# Patient Record
Sex: FEMALE | Race: WHITE | NOT HISPANIC OR LATINO | Employment: UNEMPLOYED | URBAN - METROPOLITAN AREA
[De-identification: names, ages, dates, MRNs, and addresses within clinical notes are randomized per-mention and may not be internally consistent; named-entity substitution may affect disease eponyms.]

---
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
## 2018-01-09 NOTE — MISCELLANEOUS
Reason For Visit  Reason For Visit Free Text Note Form: SW met with pt FOB and Mother re: Insurance concerns- Pts Mother had submitted MA application directly to MA with pt as dependent- Pt turned 23 on - High school senior- Pt and FOB residing with Mother-Supportive relationship- has been awaiting MA status- SW mediated contact with Antoinette Mckenna confirmed to Mother denied MA due to over income- Mother employed as reserve nurse and working extra shifts- SW contacted [place]- PT is listed as to be seen at delivery to complete MA process- Pt and Mother informed per MA to request consideration of prior medical bills when new MA katie taken at delivery- Mother has applied for [facility] and has begun pmt plan in interim- Pt coping within normal range for high possibility of Down Syndrome baby- SW provided support and resource info- In pt SW to be notified of pending birth-     Case Management Documentation [facility]:   Information obtained from the patient, patient's significant other and Parent(s)  Patient's financial status unemployed  Action Plan: follow-up needs, supportive counseling/advocacy, information provided and referral(s) made  plan reviewed  Progress Note  Pt and Mother will contact SW as needed-  Active Problems    1  Down syndrome of fetus in current pregnancy, antepartum (655 13) (O35 1XX0)   2  Encounter for pregnancy related examination in third trimester (V22 1) (Z34 83)   3  Maternal serum screen positive for trisomy 21 (796 5) (O28 8)   4  Positive depression screening (796 4) (R68 89)   5  Positive urine drug screen (796 0) (R82 5)    Current Meds   1  Prenatal TABS; Therapy: (Recorded:[id number]) to Recorded    Allergies    1  Penicillins    2   No Known Environmental Allergies    Future Appointments    Date/Time Provider Specialty Site   2016 10:00 AM  [place], Schedule  [facility]   2016 10:30 AM  [facility], [address] [place] [address]   2016 01:45 PM [facility] Rebecca Steven, CRVON Schedule  [address] [facility]     Signatures   Electronically signed by : MARICRUZ Cunningham; 2016  1:33PM EST                       (Author)

## 2018-01-10 NOTE — PROGRESS NOTES
AUG 19 2016         RE: Carmen Winter                                   To: [address]   MR#: [id number]                                  [address] [facility]   : [address] [facility], [address] [place] [address]   ENC: [id number]                             Fax: [phone]   (Exam #: [id number])      The LMP of this 14year old,  G1, P0-0-0-0 patient was MAR 24 2016, giving   her an FELIPE of DEC 29 2016 and a current gestational age of 22 weeks 1 day   by dates  A sonographic examination was performed on AUG 19 2016 using   real time equipment  The ultrasound examination was performed using   abdominal & vaginal techniques  The patient has a BMI of 26 3  Her blood   pressure today was 117/72  Earliest ultrasound found in her record: [address] [place] [address] 16  FELIPE         I had the pleasure of seeing Carmen Winter in the  center on  for a level II ultrasound  She is a 17-year-old  1 para 0 with a   working due date of  currently at 21 weeks and 1 day  She has   no significant medical or surgical history  She denies tobacco or alcohol   use  She does have an allergy to penicillin  She has a BMI of 26  She   denies any significant family history of genetic disorders birth defects   or mental retardation  She denies a family history of venous   thromboembolic disorders or diabetes mellitus  She denies any pregnancy   complications to this point  The patient appeared well-nourished,   well-developed, and in no apparent distress  Her uterus is nontender  Her   abdomen was gravid and nontender  Note that she did have the noninvasive prenatal test performed and it   showed that there was a high likelihood of Down syndrome in her fetus     There was no signs of trisomy 25 or trisomy 13      Cardiac motion was observed at 153 bpm       INDICATIONS      fetal anatomical survey   confirm gestational age      Exam Types      LEVEL II   Transvaginal RESULTS      Fetus # 1 of 1   Breech presentation   Fetal growth appeared normal   Placenta Location = Posterior   No placenta previa   Placenta Grade = I      MEASUREMENTS (* Included In Average GA)      AC              16 9 cm        21 weeks 5 days* (62%)   BPD              4 7 cm        20 weeks 1 day * (25%)   HC              17 6 cm        20 weeks 0 days* (20%)   Femur            3 0 cm        19 weeks 2 days* (10%)      Nuchal Fold      6 0 mm      Humerus          3 0 cm        19 weeks 5 days  (20%)   Radius           2 6 cm        20 weeks 5 days   Ulna             2 8 cm        20 weeks 1 day   Tibia            2 6 cm        19 weeks 3 days  (<5%)   Fibula           2 6 cm        18 weeks 5 days   Foot             3 7 cm        21 weeks 4 days      Cerebellum       2 1 cm        20 weeks 5 days   Biorbit          3 3 cm        20 weeks 6 days   CisternaMagna    4 7 mm      HC/AC           1 04   FL/AC           0 18   FL/BPD          0 64   EFW (Ac/Fl/Hc)   364 grams - 0 lbs 13 oz      THE AVERAGE GESTATIONAL AGE is 20 weeks 2 days +/- 10 days  AMNIOTIC FLUID         Largest Vertical Pocket = 3 9 cm   Amniotic Fluid: Normal      UTERINE ARTERIES                                  S/D   PI    RI    NOTCH       Left Uterine Artery              0 79       Right Uterine Artery             0 76      CERVICAL EVALUATION      The cervix appeared normal (Ultrasound Examination)  SUPINE      Cervical Length: 3 58 cm      OTHER TEST RESULTS           Funneling?: No             Dynamic Changes?: No        Resp  To TFP?: No      ANATOMY      Head                                    Normal   Face/Neck                               Abnormal   Th  Cav  Normal   Heart                                   Normal   Abd  Cav                                 Normal   Stomach                                 Normal   Right Kidney                            Normal   Left Kidney Normal   Bladder                                 Normal   Abd  Wall                               Normal   Spine                                   Normal   Extrems                                 Normal   Genitalia                               Normal   Placenta                                Normal   Umbl  Cord                              Normal   Uterus                                  Normal   PCI                                     Normal      ANATOMY DETAILS      Visualized Appearing Sonographically Normal:   HEAD: (Calvarium, BPD Level, Cavum, Lateral Ventricles, Choroid Plexus,   Cerebellum, Cisterna Magna);    FACE/NECK: (Neck, Profile, Orbits,   Nose/Lips, Palate, Face);    TH  CAV : (Diaphragm); HEART: (Four   Chamber View, Proximal Left Outflow, Proximal Right Outflow, 3 Vessel   Trachea, Short Axis of Greater Vessels, Ductal Arch, Aortic Arch,   Interventricular Septum, Interatrial Septum, Cardiac Axis, Cardiac   Position);    ABD  CAV , STOMACH, RIGHT KIDNEY, LEFT KIDNEY, BLADDER, ABD  WALL, SPINE: (Cervical Spine, Thoracic Spine, Lumbar Spine, Sacrum);      EXTREMS: (Lt Humerus, Rt Humerus, Lt Forearm, Rt Forearm, Lt Hand, Rt   Hand, Lt Femur, Rt Femur, Lt Low Leg, Rt Low Leg, Lt Foot, Rt Foot);      GENITALIA (Male), PLACENTA, UMBL  CORD, UTERUS, PCI      Abnormal:   FACE/NECK: (Nuchal Fold)      ADNEXA      The left ovary appeared normal and measured 3 0 x 1 4 x 1 3 cm with a   volume of 2 9 cc  The right ovary appeared normal and measured 3 3 x 2 0 x   2 0 cm with a volume of 6 9 cc  IMPRESSION      Miller IUP   20 weeks and 2 days by this ultrasound  (FELIPE=JAN 4 2017)   Breech presentation   Fetal growth appeared normal   Regular fetal heart rate of 153 bpm   Prominent nuchal skin fold   absent nasal bone   Posterior placenta   No placenta previa      GENERAL COMMENT      On today's ultrasound, a viable fetus was seen in the breech presentation     The placenta is posterior in location and there is no evidence of a   placenta previa  Amniotic fluid appeared normal  Fetal growth appeared   very appropriate and correlated well with her due date  Note that there   were major anomalies seen on today's exam  The nasal bone was not present  This is concerning  The nuchal skin fold also had an enlarged value   greater than 6 mm and this is also concerning  The femur length was also   somewhat small  Thus there were multiple markers today which made make it   highly likely that the fetus has Down syndrome  Both maternal ovaries were   seen and appeared normal  There were no obvious subchorionic hematomas or   uterine myomas  The uterine artery Doppler flow studies were normal  Her   cervix was seen transvaginally and appeared normal in length with no   evidence of funneling or dynamic change  The placental cord insertion were   seen and was normal in location  Down syndrome (DS) also known as trisomy 24, is a genetic disorder caused   by the presence of all, or part of a third copy of chromosome 21  It is   typically associated with physical growth delays, characteristic facial   features, and mild to moderate intellectual disability  The average IQ of a   young adult with Down syndrome is 48, equivalent to the mental age of an   6 or 5year-old child, but this can vary widely  The parents of the   affected individual are typically genetically normal The extra chromosome   occurs by random chance  There is no known behavior or environmental   factor that changes the risk  Down syndrome can be identified during   pregnancy by prenatal screening followed by diagnostic testing, or after   birth by direct observation and genetic testing  Since the introduction of   screening, pregnancies with the diagnosis are often terminated  Regular   screening for health problems common in Down syndrome is recommended   throughout the person's life   Those with Down syndrome nearly always have physical and intellectual disabilities  As adults, their mental abilities   are typically similar to those of an 6 or 5year-old  They have an   increased risk of a number of other health problems, including congenital   heart defect, epilepsy, leukemia, thyroid diseases, and mental disorders,   among others  Thus there is a high likelihood of Down syndrome in this child  The   biggest issues on today's ultrasound where the enlarged nuchal skin fold   and the absence of the nasal bone  She is scheduled to come in for a fetal   echocardiogram in 3 weeks  Follow-up growth scans are recommended every   month until delivery  I explained to Nat Calzada that she should also speak with   the neonatologist prior to delivery  After delivery, the neonatologist   will most likely obtain blood work from this child  Please note that Nat Calzada   and her boyfriend should  have their chromosomes tested as well  Thank you   kindly for this referral    Total face-to-face time with the patient, excluding ultrasound time was 15   minutes with more than 50% of the time devoted to counseling and   coordination of care  NICO Gill M D     Maternal-Fetal Medicine   Electronically signed 08/19/16 13:06           Electronically signed by:Alvin Maki MD  Aug 22 2016  7:46AM EST Acknowledgement

## 2018-01-11 NOTE — MISCELLANEOUS
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, aspirations for nursing career, resides with her Mother- Mother RN, supportive- pt significantly yngst of several sibs- good family support- Ambivilant re: pregnancy , will continue pregnancy - discussed adoption option if pt decides unable to parent at this age-FOB involved- unemployed, supportive , 21 - SW discussed marijuana use- Pt states has d/c'd with pregnancy confirmation- Informed of supportive resources as needed if chronic issue- Pt denies any other psychosocial stressors but has transferred to [employer] and is concerned about reception to pregnancy- High depression score related to pregnancy reality - Encouraged contact with SW as needed- SW met briefly with pts Mother to assure support-     Case Management Documentation [facility]:   Information obtained from the patient and Parent(s)  Action Plan: follow-up needs, supportive counseling/advocacy and information provided  plan reviewed  Progress Note  Pt will contact SW as needed prenatally-  Active Problems    1  Encounter for pregnancy related examination in second trimester (V22 1) (Z34 82)   2  Positive depression screening (796 4) (R68 89)   3  Positive urine drug screen (796 0) (R82 5)   4  Supervision of normal pregnancy in third trimester (V22 1) (Z34 83)    Current Meds   1  Prenatal TABS; Therapy: (Recorded:[id number]) to Recorded    Allergies    1   Penicillins    Future Appointments    Date/Time Provider Specialty Site   2016 09:00 AM [facility]   10/07/2016 10:00 AM [facility]   2016 10:00 AM [facility], [facility]   2016 10:00 AM [facility], [facility] Signatures   Electronically signed by : Iva Patricia MSWLCSW; Aug 25 2016  2:00PM EST                       (Author)

## 2018-01-12 VITALS
BODY MASS INDEX: 30.12 KG/M2 | HEART RATE: 104 BPM | DIASTOLIC BLOOD PRESSURE: 75 MMHG | SYSTOLIC BLOOD PRESSURE: 119 MMHG | WEIGHT: 181 LBS

## 2018-01-12 NOTE — PROGRESS NOTES
2016         RE: Kaylee Kirk                                   To: [address]   MR#: [id number]                                  [address]    : [address] [place] [address], [address] [place] [address]   ENC: [id number]                             Fax: [phone]   (Exam #: [id number])      The LMP of this 14year old,  G1, P0-0-0-0 patient was MAR 24 2016, giving   her an FELIPE of DEC 29 2016 and a current gestational age of 29 weeks 1 day   by dates  A sonographic examination was performed on 2016 using   real time equipment  The ultrasound examination was performed using   abdominal technique  The patient has a BMI of 32 1  Her blood pressure   today was 107/73  Earliest ultrasound [facility] in her record: [facility] 16  FELIPE      Cardiac motion was observed at 156 bpm       INDICATIONS      Down syndrome      Exam Types      Amniotic Fluid Index   NST      RESULTS      Fetus # 1 of 1   Vertex presentation   Placenta Location = Posterior   No placenta previa   Placenta Grade = II      The NST was reactive with no decelerations  AMNIOTIC FLUID      Q1: 4 5      Q2: 6 4      Q3: 3 9      Q4: 3 0   SELINA Total = 17 8 cm   Amniotic Fluid: Normal      IMPRESSION      Miller IUP   Vertex presentation   Fetal heart rate of 156 bpm   Posterior placenta   No placenta previa      RECOMMENDATION      SELINA: 1 Week   NST: 2X per week      NICO Quinones M D     Maternal-Fetal Medicine   Electronically signed 16 14:35           Electronically signed by:Alvin Segovia MD  2016  4:21PM EST Acknowledgement

## 2018-01-12 NOTE — PROGRESS NOTES
DEC 2 2016         RE: Patricia Ramirez                                   To: [address]   MR#: [id number]                                  [address]    : [address] [place] [address], [address] [place] [address]   ENC: [id number]                             Fax: [phone]   (Exam #: [id number])      The LMP of this 23year old,  G1, P0-0-0-0 patient was MAR 24 2016, giving   her an FELIPE of DEC 29 2016 and a current gestational age of 42 weeks 1 day   by dates  A sonographic examination was performed on DEC 2 2016 using real   time equipment  The ultrasound examination was performed using abdominal   technique  The patient has a BMI of 32 3  Her blood pressure today was   114/74  Earliest ultrasound found in her record: [facility] 16  FELIPE      Cardiac motion was observed at 147 bpm       INDICATIONS      Down syndrome   obesity   Interval growth assesment      Exam Types      Level I      RESULTS      Fetus # 1 of 1   Vertex presentation   Fetal growth appeared normal   Placenta Location = Anterior   No placenta previa   Placenta Grade = II      The NST was reactive with no decelerations  MEASUREMENTS (* Included In Average GA)      AC              33 0 cm        37 weeks 1 day * (70%)   BPD              8 6 cm        34 weeks 5 days* (27%)   HC              32 0 cm        35 weeks 4 days* (32%)   Femur            6 4 cm        32 weeks 6 days* (5%)      Cerebellum       4 8 cm        36 weeks 1 day      HC/AC           0 97   FL/AC           0 19   FL/BPD          0 74   EFW (Ac/Fl/Hc)  2732 grams - 6 lbs 0 oz                 (40%)      THE AVERAGE GESTATIONAL AGE is 35 weeks 1 day +/- 21 days        AMNIOTIC FLUID      Q1: 4 5      Q2: 3 2      Q3: 2 9      Q4: 2 5   SELINA Total = 13 1 cm   Amniotic Fluid: Normal      ANATOMY DETAILS      Visualized Appearing Sonographically Normal:   HEAD: (Calvarium, BPD Level, Cavum, Lateral Ventricles, Choroid Plexus, Cerebellum, Cisterna Magna);    TH  CAV  : (Lungs, Diaphragm);    STOMACH,   RIGHT KIDNEY, LEFT KIDNEY, BLADDER, PLACENTA      IMPRESSION      Miller IUP   35 weeks and 1 day by this ultrasound  (FELIPE=JAN 5 2017)   Vertex presentation   Fetal growth appeared normal   Regular fetal heart rate of 147 bpm   Anterior placenta   No placenta previa      GENERAL COMMENT      Her follow up care should include twice weekly NST's and a once weekly   amniotic fluid assessment, along with her daily kick counts  NICO Daily M D     Maternal-Fetal Medicine   Electronically signed 12/02/16 20:48           Electronically signed by:Alvin Fu MD  Dec  3 2016 11:41AM EST Acknowledgement

## 2018-01-13 VITALS
SYSTOLIC BLOOD PRESSURE: 106 MMHG | HEIGHT: 65 IN | BODY MASS INDEX: 29.32 KG/M2 | TEMPERATURE: 100.1 F | DIASTOLIC BLOOD PRESSURE: 70 MMHG | WEIGHT: 176 LBS

## 2018-01-13 NOTE — PROGRESS NOTES
DEC 9 2016         RE: Kaylee Kirk                                   To: [address]   MR#: [id number]                                  [address]    : [address] [place] [address], [address] [place] [address]   ENC: [id number]                             Fax: [phone]   (Exam #: [id number])      The LMP of this 23year old,  G1, P0-0-0-0 patient was MAR 24 2016, giving   her an FELIPE of DEC 29 2016 and a current gestational age of 42 weeks 1 day   by dates  A sonographic examination was performed on DEC 9 2016 using real   time equipment  The ultrasound examination was performed using abdominal   technique  The patient has a BMI of 32 3  Her blood pressure today was   110/75  Earliest ultrasound found in her record: [facility] 16  FELIPE      Cardiac motion was observed at 148 bpm       INDICATIONS      Down syndrome      Exam Types      Amniotic Fluid Index   NST      RESULTS      Fetus # 1 of 1   Vertex presentation   Placenta Location = Fundal   No placenta previa   Placenta Grade = II      The NST was reactive with no decelerations  AMNIOTIC FLUID      Q1: 4 8      Q2: 4 9      Q3: 1 6      Q4: 2 8   SELINA Total = 14 2 cm   Amniotic Fluid: Normal      IMPRESSION      Miller IUP   Vertex presentation   Regular fetal heart rate of 148 bpm   Fundal placenta   No placenta previa      RECOMMENDATION      SELINA: 1 Week   NST: 2X per week      Megan Hem, R D M S Percell Closs, M D     Maternal-Fetal Medicine   Electronically signed 16 11:58           Electronically signed by:Alvin Segovia MD  Dec 15 2016 12:45PM EST Acknowledgement

## 2018-01-13 NOTE — PROGRESS NOTES
DEC 16 2016         RE: Sugey Rosado                                   To: [address]   MR#: [id number]                                  [address] [facility]   : [address] [facility], [address] [place] [address]   ENC: [id number]                             Fax: [phone]   (Exam #: [id number])      The LMP of this 23year old,  G1, P0-0-0-0 patient was MAR 24 2016, giving   her an FELIPE of DEC 29 2016 and a current gestational age of 37 weeks 1 day   by dates  A sonographic examination was performed on DEC 16 2016 using   real time equipment  The ultrasound examination was performed using   abdominal technique  Earliest ultrasound found in her record: [address]  [facility] 16  FELIPE      Cardiac motion was observed at 153 bpm       INDICATIONS      Down syndrome   amniotic fluid check      Exam Types      Amniotic Fluid Index      RESULTS      Fetus # 1 of 1   Vertex presentation   Placenta Location = Posterior   Placenta Grade = II      The NST was reactive with no decelerations  AMNIOTIC FLUID      Q1: 3 9      Q2: 4 7      Q3: 3 3      Q4: 0 8   SELINA Total = 12 7 cm   Amniotic Fluid: Normal      IMPRESSION      Miller IUP   Vertex presentation   Regular fetal heart rate of 153 bpm   Posterior placenta      GENERAL COMMENT      Her follow up care should include twice weekly NST's and a once weekly   amniotic fluid assessment, along with her daily kick counts  NICO Hill S , R TESHA C S  ORTIZ Singletary     Maternal-Fetal Medicine   Electronically signed 16 19:00           Electronically signed by:Alvin Quinonez MD  Dec 19 2016  4:57PM EST Acknowledgement

## 2018-01-13 NOTE — PROGRESS NOTES
JAZMIN 3 2017         RE: Patricia Ramirez                                   To: [address]   MR#: [id number]                                  [address]   : [address], [address] [facility]   ENC: [id number]                             Fax: [phone]   (Exam #: [id number])      The LMP of this 23year old,  G1, P0-0-0-0 patient was MAR 24 2016, giving   her an FELIPE of DEC 29 2016 and a current gestational age of 43 weeks 5 days   by dates  A sonographic examination was performed on JAZMIN 3 2017 using real   time equipment  The ultrasound examination was performed using abdominal   technique  The patient has a BMI of 33 4  Her blood pressure today was   128/66  Earliest ultrasound found in her record: [facility] 16  FELIPE      Cardiac motion was observed at 157 bpm       INDICATIONS      Down syndrome   post FELIPE      Exam Types      Amniotic Fluid Index   NST      RESULTS      Fetus # 1 of 1   Vertex presentation   Placenta Location = Posterior   No placenta previa   Placenta Grade = III      The NST was reactive with no decelerations  AMNIOTIC FLUID      Q1: 2 3      Q2: 4 4      Q3: 1 2      Q4:   SELINA Total = 7 9 cm   Amniotic Fluid: Normal      IMPRESSION      Miller IUP   Vertex presentation   Regular fetal heart rate of 157 bpm   Posterior placenta   No placenta previa      GENERAL COMMENT      Rajinder Harrison was sent to Labor and Delivery for further evaluation and management   with plans for labor induction given her current gestational age of 40-5/7   weeks, cervical dilation of 3 cm on recent exam, and suspicion of Down   syndrome which is associated with an increased risk for stillbirth  NICO Ferreira M D     Maternal-Fetal Medicine   Electronically signed 17 16:11

## 2018-01-13 NOTE — PROGRESS NOTES
SEP 9 2016         RE: Shahrzad Chavez                                   To: [address]   MR#: [id number]                                  [address]    : [address] [place] [address], [id number] [place] [address]   ENC: [id number]                             Fax: [phone]   (Exam #: [id number])      The LMP of this 14year old,  G1, P0-0-0-0 patient was MAR 24 2016, giving   her an FELIPE of DEC 29 2016 and a current gestational age of 23 weeks 1 day   by dates  A sonographic examination was performed on SEP 9 2016 using real   time equipment  The ultrasound examination was performed using abdominal   technique  Earliest ultrasound found in her record: [facility] US 16  FELIPE      Kenny Thomas has no complaints  She reports fetal movement and denies vaginal   bleeding  She presents for fetal echocardiography today for the indication   of recent non invasive prenatal testing which was positive for Down   syndrome        Cardiac motion was observed at 142 bpm       INDICATIONS      abnormal [place]      Exam Types      Fetal Echocardiogram      RESULTS      Fetus # 1 of 1   Vertex presentation   Placenta Location = Posterior   No placenta previa   Placenta Grade = I      AMNIOTIC FLUID      Q1: 5 5      Q2: 4 8      Q3: 4 2      Q4: 4 1   SELINA Total = 18 6 cm   Amniotic Fluid: Normal      FETAL VESSELS                                     S/D   PI    RI    PSV   AEDV RF                                                    cm/s       Umbilical Artery                 1 51       Middle Cerebral Artery           1 54      FETAL VESSELS                                    PVSys PVDia PASys  S/D   S/A   DVI   RF       Ductus Venosus:                                                No      IMPRESSION      Miller IUP   Vertex presentation   Regular fetal heart rate of 142 bpm   Posterior placenta   No placenta previa      GENERAL COMMENT      Fetal echocardiography was performed for the indication of  suspicion of   Down syndrome on non invasive prenatal testing  The heart is in normal   anatomic position within the left chest   All four chambers were   identified with a normal, 45-degree leftward axis  There is no suspicion   of an echogenic intracardiac focus  Tricuspid regurgitation is not   present  Right and left ventricular and atrial sizes were concordant  The ventricular and atrial septi appear intact by 2D echo and color   Doppler  The aorta arises in normal anatomic relationship from the left   ventricle  The pulmonary artery arises in normal anatomic relationship   from the right ventricle  The short axis and three vessel views appear   normal   The ductus arterosis joins the aorta in a normal anatomic   relationship  The aortic arch, pulmonary veins, inferior and superior   vena cava, and the right and left ventricular outflow tracts were   identified and appear normal  The flow velocities across the mitral,   tricuspid, aortic, pulmonary valves, aortic arch and ductus arteriosus,   appear normal  There is no evidence of an anatomical defect present within   the heart  The fetal heart rate was regular throughout the exam period  There is no suspicion of a fetal dysrhythmia  The umbilical and middle   cerebral artery, and ductus venosus, Doppler studies are normal       Today's ultrasound findings were discussed in detail with Anel  She was   advised of the findings and counseled about the limitations of fetal   echocardiography in detecting all forms of congenital anomalies  For   example, fetal echocardiography may not detect small septal defects and   minor valvular abnormalities  Other examples of difficult    diagnosis include post valvular stenosis, coarctation of the aorta, and   anomalous pulmonary venous return  I recommended that Jake Kebede consider   amniocentesis for definitive prenatal diagnosis of Down syndrome   The   positive predictive value of NIPT for a 14year-old patient is less than   50%, though the absent nasal bone and mildly increased nuchal skin fold   thickness on the level II ultrasound study further suggests the   possibility of Down syndrome  Pawel Watters declined genetic amniocentesis at the   visit today  She will [facility]   on October 7 for   follow-up [facility] ultrasound evaluation  Serial fetal interval ultrasound   studies are recommended during the third trimester  Non stress testing is   recommended for additional pregnancy surveillance beginning at about 30   weeks gestation, as Down syndrome is associated with increased risk for   stillbirth  The face to face time, in addition to time spent discussing ultrasound   results, was 10 minutes, greater than 50% of which was spent during   counseling and coordination of care  NICO Jones M D     Maternal-Fetal Medicine   Electronically signed 09/11/16 11:08           Electronically signed by:Alvin Ferris MD  Sep 13 2016  9:37AM EST Acknowledgement

## 2018-01-14 VITALS
BODY MASS INDEX: 29.16 KG/M2 | HEART RATE: 99 BPM | WEIGHT: 175.25 LBS | SYSTOLIC BLOOD PRESSURE: 135 MMHG | DIASTOLIC BLOOD PRESSURE: 89 MMHG

## 2018-01-14 VITALS
SYSTOLIC BLOOD PRESSURE: 128 MMHG | BODY MASS INDEX: 33.52 KG/M2 | HEIGHT: 65 IN | WEIGHT: 201.2 LBS | DIASTOLIC BLOOD PRESSURE: 66 MMHG

## 2018-01-14 NOTE — PROGRESS NOTES
2016         RE: Peggy Atwood                                   To: [address]   MR#: [id number]                                  [address]    : [address] [place] [address], [address] [place] [address]   ENC: [id number]                             Fax: [phone]   (Exam #: [id number])      The LMP of this 23year old,  G1, P0-0-0-0 patient was MAR 24 2016, giving   her an FELIPE of DEC 29 2016 and a current gestational age of 27 weeks 1 day   by dates  A sonographic examination was performed on 2016 using   real time equipment  The patient has a BMI of 31 9  Her blood pressure   today was 115/74  Earliest ultrasound found in her record: [facility] 16  FELIPE      Cardiac motion was observed at 150 bpm       INDICATIONS      Down syndrome   obesity      Exam Types      Amniotic Fluid Index      RESULTS      Fetus # 1 of 1   Vertex presentation   Placenta Location = Posterior   No placenta previa   Placenta Grade = II      AMNIOTIC FLUID      Q1: 4 3      Q2: 3 2      Q3: 5 1      Q4:   SELINA Total = 12 6 cm   Amniotic Fluid: Normal      IMPRESSION      Miller IUP   Vertex presentation   Fetal heart rate of 150 bpm   Posterior placenta   No placenta previa      GENERAL COMMENT      The patient presented today for antepartum fetal surveillance secondary   to[default value]  She had a modified biophysical profile, which includes   an NST and evaluation of the amniotic fluid  The NST was reactive and   reassuring  The amniotic fluid index was 12 6cm, which is normal for   gestational age  Recommend continued twice weekly fetal testing secondary to suspected   fetal trisomy 21  Thank very much for allowing us to participate in the care of your   patient  Should you have any questions, please do not hesitate to contact   our office  NICO Purdy M D     Electronically signed 16 09:28

## 2018-01-15 NOTE — PROGRESS NOTES
2016         RE: Yosi Prado                                   To: [address]   MR#: [id number]                                  [address]   : [address] [facility], [address] [place] [address]   ENC: [id number]                             Fax: [phone]   (Exam #: [id number])      The LMP of this 14year old,  G1, P0-0-0-0 patient was MAR 24 2016, giving   her an FELIPE of DEC 29 2016 and a current gestational age of 29 weeks 1 day   by dates  A sonographic examination was performed on 2016 using   real time equipment  The ultrasound examination was performed using   abdominal technique  The patient has a BMI of 31 6  Her blood pressure   today was 119/77  Earliest ultrasound found in her record: [facility] 16  FELIPE      Cardiac motion was observed at 161 bpm       INDICATIONS      Down syndrome      Exam Types      Amniotic Fluid Index      RESULTS      Fetus # 1 of 1   Transverse presentation   Placenta Location = Posterior   No placenta previa   Placenta Grade = II      AMNIOTIC FLUID      Q1: 4 4      Q2: 4 4      Q3: 2 3      Q4: 4 3   SELINA Total = 15 4 cm   Amniotic Fluid: Normal      IMPRESSION      Miller IUP   Transverse presentation   Regular fetal heart rate of 161 bpm   Posterior placenta   No placenta previa      GENERAL COMMENT      The patient presented today for antepartum fetal surveillance secondary to   suspected fetus with Down syndrome  She had a modified biophysical   profile, which includes an NST and evaluation of the amniotic fluid  The   NST was reactive and reassuring  The amniotic fluid index was 15 4 cm,   which is normal for gestational age  Recommend continued twice weekly fetal testing secondary to suspected   fetus with Down syndrome  Thank very much for allowing us to participate in the care of your   patient    Should you have any questions, please do not hesitate to contact   our office  NICO Barrios M D     Electronically signed 11/15/16 10:43           Electronically signed by:Alvin Santiago MD  Nov 30 2016  4:26PM EST Acknowledgement

## 2018-01-15 NOTE — PROGRESS NOTES
DEC 30 2016         RE: Peggy Atwood                                   To: [address]   MR#: [id number]                                  [address]    : [address] [place] [address], [address] [place] [address]   ENC: [id number]                             Fax: [phone]   (Exam #: [id number])      The LMP of this 23year old,  G1, P0-0-0-0 patient was MAR 24 2016, giving   her an FELIPE of DEC 29 2016 and a current gestational age of 43 weeks 1 day   by dates  A sonographic examination was performed on DEC 30 2016 using   real time equipment  The ultrasound examination was performed using   abdominal technique  The patient has a BMI of 33 4  Her blood pressure   today was 124/83  Earliest ultrasound found in her record: [facility] 16  FELIPE      Cardiac motion was observed at 165 bpm       INDICATIONS      Down syndrome      Exam Types      Amniotic Fluid Index      RESULTS      Fetus # 1 of 1   Vertex presentation   Placenta Location = Posterior   No placenta previa   Placenta Grade = III      The NST was reactive with no decelerations  AMNIOTIC FLUID      Q1: 2 2      Q2: 2 3      Q3: 2 9      Q4: 2 3   SELINA Total = 9 7 cm   Amniotic Fluid: Normal      IMPRESSION      Miller IUP   Vertex presentation   Regular fetal heart rate of 165 bpm   Posterior placenta   No placenta previa      GENERAL COMMENT      Her follow up care should include twice weekly NST's and a once weekly   amniotic fluid assessment, along with her daily kick counts  Shareen Primrose, R D M S , R TESHA C S  ORTIZ Stallworth     Maternal-Fetal Medicine   Electronically signed 16 18:25           Electronically signed by:Alvin Rock MD  2017  8:26PM EST Acknowledgement

## 2018-01-15 NOTE — MISCELLANEOUS
Reason For Visit  Reason For Visit Free Text Note Form: SW f/u teen pregnancy- FOB accompanied pt to prenatal appt- supportive, seeking employment with plans to persue [employer]/- couple residing together at pts Mother's home- Couple discussed possibility of Down's Syndrome dx per genetic testing results- Aware and optimistic re: healthy outcome- Provided info re: support emotionally prenatally as needed- Maternal gm is RN and couple stating adequate support at this time- declined need for [facility]- Pt anxious regarding clinical summary reflecting positive prenatal drug screen and reference to positive depression screen- SW provided reassurance re: health care intent to provided intervention if struggling with same for healthy outcome for pt and baby- Pt denies continued marijuana use and sl elevated depression screen secondary to unplanned teen pregnancy- [employer] school personnel has been supportive of pt with pregnancy- Encouraged contact with SW as needed-   Case Management Documentation [facility]:   Information obtained from the patient and patient's significant other  Action Plan: follow-up needs, supportive counseling/advocacy and referral(s) made  plan reviewed  Progress Note  Pt will contact SW as needed-  Active Problems    1  Encounter for pregnancy related examination in second trimester (V22 1) (Z34 82)   2  Maternal serum screen positive for trisomy 21 (796 5) (O28 8)   3  Positive depression screening (796 4) (R68 89)   4  Positive urine drug screen (796 0) (R82 5)   5  Pregnancy, supervision, high-risk, second trimester (V23 9) (O09 92)    Current Meds   1  Prenatal TABS; Therapy: (Recorded:48Svr0307) to Recorded    Allergies    1   Penicillins    Results/Data  OB Global Urine Dip Non-Automated - POC 48CIF6427 01:55PM Anali Majano     Test Name Result Flag Reference   Color Clear     Protein negative     Glucose negative Assessment    1  Pregnancy, supervision, high-risk, second trimester (V23 9) (O09 92)    Plan    1  OB Global Urine Dip Non-Automated - POC; Status:Complete - Retrospective By Protocol   Authorization;   Done: 22RHK0538 01:55PM    2   Follow-up Visit in 4 Weeks Evaluation and Treatment  Follow-up  Status: Complete    Done: 17AMC3189    Future Appointments    Date/Time Provider Specialty Site   2016 09:00 AM  [place], Schedule  [facility]   10/07/2016 10:00 AM  [facility] [place], Schedule  [facility]   2016 10:00 AM  [facility] [place], Schedule  [facility]   2016 10:00 AM  [facility], Schedule  [facility]   2016 09:30 AM [facility] TORY ARTHUR Schedule  [facility]     Signatures   Electronically signed by : Lenon Callas, MSWLCSW; Aug 25 2016  3:43PM EST                       (Author)

## 2018-01-16 NOTE — PROGRESS NOTES
2016         RE: Mariah Willard                                   To: [address]   MR#: [id number]                                  [address]   : [address] [facility], [address] [place] [address]   ENC: [id number]                             Fax: [phone]   (Exam #: [id number])      The LMP of this 14year old,  G1, P0-0-0-0 patient was MAR 24 2016, giving   her an FELIPE of DEC 29 2016 and a current gestational age of 26 weeks 1 day   by dates  A sonographic examination was performed on 2016 using real   time equipment  The ultrasound examination was performed using abdominal   technique  The patient has a BMI of 30 9  Her blood pressure today was   112/72  Earliest ultrasound found in her record: [facility] 16  FELIPE      Erum Arreola has no complaints today  She reports regular fetal movements and   denies problems related to hypertension, gestational diabetes,    labor, or vaginal bleeding  Cardiac motion was observed at 150 bpm       INDICATIONS      Down syndrome      Exam Types      Level I   NST      RESULTS      Fetus # 1 of 1   Vertex presentation   Fetal growth appeared normal   Placenta Location = Posterior, fundal   No placenta previa   Placenta Grade = II      The NST was reactive with no decelerations  MEASUREMENTS (* Included In Average GA)      AC              29 0 cm        33 weeks 1 day * (66%)   BPD              8 0 cm        32 weeks 2 days* (45%)   HC              28 7 cm        31 weeks 2 days* (19%)   Femur            5 6 cm        29 weeks 3 days* (<5%)      Humerus          5 1 cm        29 weeks 6 days  (15%)      Cerebellum       4 0 cm        33 weeks 4 days      HC/AC           0 99   FL/AC           0 19   FL/BPD          0 70   EFW (Ac/Fl/Hc)  1816 grams - 4 lbs 0 oz                 (40%)      THE AVERAGE GESTATIONAL AGE is 31 weeks 4 days +/- 18 days        AMNIOTIC FLUID      Q1: 3 2      Q2: 4 2 Q3: 3 7      Q4: 4 8   SELINA Total = 15 8 cm   Amniotic Fluid: Normal      ANATOMY DETAILS      Visualized Appearing Sonographically Normal:   HEAD: (Calvarium, BPD Level, Cavum, Lateral Ventricles, Choroid Plexus,   Cerebellum, Cisterna Magna);    TH  CAV  : (Lungs, Diaphragm); HEART: (3   Vessel Trachea, Short Axis of Greater Vessels, Interventricular Septum);      STOMACH, RIGHT KIDNEY, LEFT KIDNEY, BLADDER, PLACENTA      IMPRESSION      Miller IUP   31 weeks and 4 days by this ultrasound  (FELIPE=JAN 2 2017)   Vertex presentation   Fetal growth appeared normal   Regular fetal heart rate of 150 bpm   Posterior, fundal placenta   No placenta previa      GENERAL COMMENT      No fetal structural abnormality is identified on the Level I survey today  The femur length is measured at less than the 5th percentile for this   gestational age  Fetal interval growth and amniotic fluid volume are   otherwise normal       Today's ultrasound findings and suggested follow-up were discussed in   detail with Anel  Fetal interval growth will be reassessed in 4 weeks  Suggested follow-up fetal surveillance also includes continuation of twice   per week NST's, weekly SELINA's, and daily kick counts  The face to face time, in addition to time spent discussing ultrasound   results, was approximately 10 minutes, greater than 50% of which was spent   during counseling and coordination of care  NICO Cornejo M D     Maternal-Fetal Medicine   Electronically signed 11/04/16 13:31           Electronically signed by:Alvin Deutsch MD  Nov 14 2016  8:26AM EST Acknowledgement

## 2018-01-16 NOTE — PROGRESS NOTES
DEC 23 2016         RE: Ronaldo Watkins                                   To: [address]   MR#: [id number]                                  [address]   : [address] [place] [address], [address] [place] [address]   ENC: [id number]                             Fax: [phone]   (Exam #: [id number])      The LMP of this 23year old,  G1, P0-0-0-0 patient was MAR 24 2016, giving   her an FELIPE of DEC 29 2016 and a current gestational age of 43 weeks 1 day   by dates  A sonographic examination was performed on DEC 23 2016 using   real time equipment  Earliest ultrasound found in her record: [facility] 16  FELIPE      Cardiac motion was observed at 146 bpm       INDICATIONS      Down syndrome      Exam Types      Amniotic Fluid Index   NST      RESULTS      Fetus # 1 of 1   Vertex presentation   Placenta Location = Posterior   No placenta previa   Placenta Grade = II      The NST was reactive with no decelerations  AMNIOTIC FLUID      Q1: 4 9      Q2: 4 2      Q3: 3 0      Q4:   SELINA Total = 12 1 cm   Amniotic Fluid: Normal      IMPRESSION      Miller IUP   Vertex presentation   Regular fetal heart rate of 146 bpm   Posterior placenta   No placenta previa      RECOMMENDATION      SELINA: 1 Week   NST: 2X per week      NICO Quintanilla S , R ORTIZ Villafuerte     Maternal-Fetal Medicine   Electronically signed 16 11:13

## 2018-01-17 NOTE — PROGRESS NOTES
Active Problems    1  Down syndrome of fetus in current pregnancy, antepartum (655 13) (O35 1XX0)    Allergies    1  Penicillins    2  No Known Environmental Allergies    Vitals  Signs   Recorded: 53GJN9012 54:54CM   Systolic: 048, LUE, Sitting  Diastolic: 70, LUE, Sitting  Pain Scale: 0  Height: 5 ft 5 in  Weight: 185 lb 6 4 oz  BMI Calculated: 30 85  BSA Calculated: 1 92  BMI Percentile: 95 %  2-20 Stature Percentile: 62 %  2-20 Weight Percentile: 96 %  BP Cuff Size: Large    Procedure    G/P 1/0   [facility] 2016   EGA 31 5/   /70   Indication: fetal chromosome abnormality  Duration of Test 21 minutes  Result: Reactive and > 15 bpm with movement  Baseline Rate 140 bpm    Deceleration: None  Uterine Activity: None  Required # Stimuli Response: No    Fetal kick counts were reviewed with the patient  Recommend NST: twice weekly  Recommend SELINA: weekly  Current Meds   1  Prenatal TABS;    Therapy: (Recorded:08Ixu3335) to Recorded    Future Appointments    Date/Time Provider Specialty Site   2016 10:00 AM  [place], Schedule   [facility]   2016 10:30 AM  [place], [facility]   2016 09:00 AM  [facility]   2016 01:30 PM  [place], Schedule  [facility]   2016 02:00 PM  [place], [facility]   2016 10:00 AM  [place], [facility]   2016 10:30 AM  [place], [facility]   2016 09:45 AM [facility], Physician Schedule  [facility]     Signatures   Electronically signed by : Robert Jefferson, ; 2016  1:57PM EST                       (Author)    Electronically signed by : URIEL Demarco MD; 2016  6:07PM EST                       (Author)

## 2018-01-18 NOTE — PROGRESS NOTES
OCT 21 2016         RE: Linda Burns                                   To: [address]   MR#: [id number]                                  [address]    : [address] [place] [address], [address] [place] [address]   ENC: [id number]                             Fax: [phone]   (Exam #: [id number])      The LMP of this 14year old,  G1, P0-0-0-0 patient was MAR 24 2016, giving   her an FELIPE of DEC 29 2016 and a current gestational age of 31 weeks 1 day   by dates  A sonographic examination was performed on OCT 21 2016 using   real time equipment  The ultrasound examination was performed using   abdominal technique  The patient has a BMI of 30 3  Her blood pressure   today was 103/70  Earliest ultrasound found in her record: [facility] 16  FELIPE      Cardiac motion was observed at 141 bpm       INDICATIONS      Down syndrome      Exam Types      Biophysical Profile      RESULTS      Fetus # 1 of 1   Vertex presentation   Placenta Location = Posterior   Placenta Grade = I      The NST was reactive with no decelerations  AMNIOTIC FLUID      Q1: 3 6      Q2: 4 7      Q3: 6 2      Q4: 4 0   SELINA Total = 18 5 cm   Amniotic Fluid: Normal      BIOPHYSICAL PROFILE      The Biophysical Profile score was 8/10  Breathin  Movement: 2  Tone: 2  AFV: 2  NST: 2   The NST was reactive with no decelerations  IMPRESSION      Miller IUP   Vertex presentation   Regular fetal heart rate of 141 bpm   Posterior placenta      RECOMMENDATION      SELINA: 1 Week   NST: 2X per week      NICO Castro M D     Maternal-Fetal Medicine   Electronically signed 10/21/16 14:43           Electronically signed by:Alvin Santiago MD  Oct 26 2016  2:05PM EST Acknowledgement

## 2018-01-18 NOTE — PROGRESS NOTES
OCT 7 2016         RE: Martin Argueta                                   To: [address]   MR#: [id number]                                  [address]   : [address], [address] [facility]   ENC: [id number]                             Fax: [phone]   (Exam #: [id number])      The LMP of this 14year old,  G1, P0-0-0-0 patient was MAR 24 2016, giving   her an FELIPE of DEC 29 2016 and a current gestational age of 35 weeks 1 day   by dates  A sonographic examination was performed on OCT 7 2016 using real   time equipment  The ultrasound examination was performed using abdominal   technique  The patient has a BMI of 30 2  Her blood pressure today was   135/83  Earliest ultrasound found in her record: [facility] 16  FELIPE      Joan Stephenson has no complaints today  She reports regular fetal movements and   denies problems related to hypertension,  labor, or vaginal   bleeding  She has not yet been screened for gestational diabetes  Joan Stephenson has   not yet received the influenza vaccine  Cardiac motion was observed at 147 bpm       INDICATIONS      Down syndrome      Exam Types      Level I      RESULTS      Fetus # 1 of 1   Vertex presentation   Fetal growth appeared normal   Placenta Location = Posterior   No placenta previa   Placenta Grade = II      MEASUREMENTS (* Included In Average GA)      AC              24 6 cm        28 weeks 6 days* (61%)   BPD              7 0 cm        28 weeks 1 day * (42%)   HC              25 2 cm        27 weeks 1 day * (18%)   Femur            4 7 cm        25 weeks 6 days* (<5%)      Humerus          4 2 cm        25 weeks 3 days  (<5%)      Cerebellum       3 3 cm        29 weeks 3 days      HC/AC           1 02   FL/AC           0 19   FL/BPD          0 67   EFW (Ac/Fl/Hc)  1099 grams - 2 lbs 7 oz                 (41%)      THE AVERAGE GESTATIONAL AGE is 27 weeks 4 days +/- 14 days        AMNIOTIC FLUID Q1: 4 7      Q2: 5 3      Q3: 3 7      Q4: 3 6   SELINA Total = 17 3 cm   Amniotic Fluid: Normal      ANATOMY DETAILS      Visualized Appearing Sonographically Normal:   HEAD: (Calvarium, BPD Level, Cavum, Lateral Ventricles, Cerebellum,   Cisterna Magna);    TH  CAV : (Diaphragm); HEART: (Four Chamber View,   Proximal Left Outflow, Proximal Right Outflow, 3 Vessel Trachea, Cardiac   Axis, Cardiac Position);    STOMACH, RIGHT KIDNEY, LEFT KIDNEY, BLADDER,   SPINE: (Cervical Spine, Thoracic Spine, Lumbar Spine, Sacrum); PLACENTA      IMPRESSION      Miller IUP   27 weeks and 4 days by this ultrasound  (FELIPE=JAN 2 2017)   Vertex presentation   Fetal growth appeared normal   Regular fetal heart rate of 147 bpm   Posterior placenta   No placenta previa      GENERAL COMMENT      No fetal structural abnormality is identified on the Level I survey today  The fetal humerus and femur lengths are measured at less than the 5th   percentile for this gestational age  Fetal interval growth and amniotic   fluid volume are otherwise normal       Today's ultrasound findings and suggested follow-up were discussed in   detail with Anel Whitaker Anel declining confirmatory genetic   amniocentesis earlier in the pregnancy, the prior pregnancy findings of an   increased nuchal skin fold thickness and absent nasal bone, along with   today's findings of shortened femur and humerus lengths, along with   abnormal non invasive prenatal testing using cell free DNA analysis, is   quite suspicious for Down syndrome  She will begin twice per week non   stress testing in 2 weeks given an increased stillbirth risk in   association with Down syndrome  Fetal interval growth and anatomy will be   reassessed in 4 weeks  We discussed the importance of receiving the   influenza vaccine soon  Daily third trimester fetal kick counting was   discussed at the visit today        The face to face time, in addition to time spent discussing ultrasound results, was approximately 10 minutes, greater than 50% of which was spent   during counseling and coordination of care  NICO Nicholson M D     Maternal-Fetal Medicine   Electronically signed 10/09/16 10:15           Electronically signed by:Alvin Gee MD  Oct 10 2016 10:21AM EST Acknowledgement

## 2018-01-22 VITALS — DIASTOLIC BLOOD PRESSURE: 72 MMHG | SYSTOLIC BLOOD PRESSURE: 118 MMHG

## 2018-01-22 VITALS
DIASTOLIC BLOOD PRESSURE: 80 MMHG | SYSTOLIC BLOOD PRESSURE: 124 MMHG | RESPIRATION RATE: 16 BRPM | HEART RATE: 102 BPM | HEIGHT: 65 IN | WEIGHT: 201.5 LBS | BODY MASS INDEX: 33.57 KG/M2

## 2018-01-29 DIAGNOSIS — Z30.41 ENCOUNTER FOR SURVEILLANCE OF CONTRACEPTIVE PILLS: Primary | ICD-10-CM

## 2018-01-29 RX ORDER — NORGESTIMATE AND ETHINYL ESTRADIOL 0.25-0.035
1 KIT ORAL DAILY
COMMUNITY
Start: 2017-02-22 | End: 2018-01-29 | Stop reason: SDUPTHER

## 2018-01-29 RX ORDER — NORGESTIMATE AND ETHINYL ESTRADIOL 0.25-0.035
1 KIT ORAL DAILY
Qty: 28 TABLET | Refills: 0 | Status: SHIPPED | OUTPATIENT
Start: 2018-01-29 | End: 2018-05-29 | Stop reason: SDUPTHER

## 2018-03-07 NOTE — PROGRESS NOTES
Education  "[employer] Education Post Partum:   Post Partum Education provided:   benefits of breastfeeding, importance of exclusive breastfeeding, exclusive breastfeeding for the first 6 months, importance of breastfeeding after 6 months following introduction of other foods, frequent feedings for optimal milk production, feeding on demand/baby-led feedings and effective positioning and attachment  The patient is breastfeeding  The patient is planning on exclusively breastfeeding until the baby is 10months of age  The patient is not formula feeding  The baby has seen a pediatrician  The pediatrician is in the network Pediatrician name: [facility]   Individualized education given: Johann Diggs RN     Post partum education provided by: Swapnil Cuevas RN      Signatures   Electronically signed by : Kay Sellers RN; Feb 1 2017  3:19PM EST                       (Author)

## 2018-05-29 DIAGNOSIS — Z30.41 ENCOUNTER FOR SURVEILLANCE OF CONTRACEPTIVE PILLS: ICD-10-CM

## 2018-05-29 RX ORDER — NORGESTIMATE AND ETHINYL ESTRADIOL 0.25-0.035
1 KIT ORAL DAILY
Qty: 28 TABLET | Refills: 0 | Status: SHIPPED | OUTPATIENT
Start: 2018-05-29 | End: 2018-06-07 | Stop reason: SDUPTHER

## 2018-06-07 ENCOUNTER — OFFICE VISIT (OUTPATIENT)
Dept: OBGYN CLINIC | Facility: CLINIC | Age: 18
End: 2018-06-07
Payer: COMMERCIAL

## 2018-06-07 VITALS
HEART RATE: 92 BPM | HEIGHT: 65 IN | DIASTOLIC BLOOD PRESSURE: 68 MMHG | WEIGHT: 158.4 LBS | BODY MASS INDEX: 26.39 KG/M2 | SYSTOLIC BLOOD PRESSURE: 106 MMHG

## 2018-06-07 DIAGNOSIS — Z20.2 POSSIBLE EXPOSURE TO STD: ICD-10-CM

## 2018-06-07 DIAGNOSIS — Z30.41 ENCOUNTER FOR SURVEILLANCE OF CONTRACEPTIVE PILLS: Primary | ICD-10-CM

## 2018-06-07 PROCEDURE — 99213 OFFICE O/P EST LOW 20 MIN: CPT | Performed by: NURSE PRACTITIONER

## 2018-06-07 RX ORDER — NORGESTIMATE AND ETHINYL ESTRADIOL 0.25-0.035
1 KIT ORAL DAILY
Qty: 28 TABLET | Refills: 11 | Status: SHIPPED | OUTPATIENT
Start: 2018-06-07

## 2018-06-07 NOTE — PROGRESS NOTES
Assessment/Plan:    Pt to complete urine test for GC/CT  Reviewed safe sexual practices  RTO in 1 year for annual      Diagnoses and all orders for this visit:    Encounter for surveillance of contraceptive pills  -     norgestimate-ethinyl estradiol ([place]) 0 25-35 MG-MCG per tablet; Take 1 tablet by mouth daily    Possible exposure to STD  -     Chlamydia/GC amplified DNA by PCR; Future          Subjective:      Patient ID: Vera Mcbride is a 21 y o  female  HPI 78-year-old  here for contraception surveillance  Patient has been on [address] for contraception  She is happy with use desires to continue, she declines [facility]  She stopped for a couple months because she was not sexually active and recently restarted  She is in a new relationship and consents to urine test for Chlamydia and gonorrhea  She denies any problems with her OCP, she denies any headaches visual changes, chest pain, leg pain, or abdominal pain  Her menses are regular with use  Patient delivered 2017,  with a second-degree laceration, her baby has Down syndrome, his name is [facility]  Patient denies any problems, she is very happy in her life  The following portions of the patient's history were reviewed and updated as appropriate: allergies, current medications, past family history, past medical history, past social history, past surgical history and problem list     Review of Systems   Respiratory: Negative  Cardiovascular: Negative  Neurological: Negative  Psychiatric/Behavioral: Negative  Objective:      /68 (BP Location: Left arm, Patient Position: Sitting, Cuff Size: Adult)   Pulse 92   Ht 5' 5" (1 651 m)   Wt 71 8 kg (158 lb 6 4 oz)   LMP 2018   Breastfeeding? No   BMI 26 36 kg/m²          Physical Exam   Constitutional: She appears well-developed and well-nourished  Cardiovascular: Normal rate and normal heart sounds      Pulmonary/Chest: Effort normal and breath sounds normal    Abdominal: Soft  There is no tenderness  Psychiatric: She has a normal mood and affect

## 2018-10-07 ENCOUNTER — HOSPITAL ENCOUNTER (EMERGENCY)
Facility: HOSPITAL | Age: 18
Discharge: HOME | End: 2018-10-07
Attending: EMERGENCY MEDICINE | Admitting: EMERGENCY MEDICINE
Payer: COMMERCIAL

## 2018-10-07 VITALS
OXYGEN SATURATION: 99 % | SYSTOLIC BLOOD PRESSURE: 114 MMHG | HEART RATE: 74 BPM | RESPIRATION RATE: 16 BRPM | DIASTOLIC BLOOD PRESSURE: 67 MMHG | TEMPERATURE: 98.9 F

## 2018-10-07 DIAGNOSIS — R11.2 NON-INTRACTABLE VOMITING WITH NAUSEA, UNSPECIFIED VOMITING TYPE: Primary | ICD-10-CM

## 2018-10-07 LAB
BACTERIA UR QL AUTO: ABNORMAL /HPF
BILIRUB UR QL STRIP: NEGATIVE
CLARITY UR: CLEAR
COLOR UR: YELLOW
EXT PREG TEST URINE: NEGATIVE
GLUCOSE UR STRIP-MCNC: NEGATIVE MG/DL
HGB UR QL STRIP.AUTO: NEGATIVE
KETONES UR STRIP-MCNC: NEGATIVE MG/DL
LEUKOCYTE ESTERASE UR QL STRIP: ABNORMAL
NITRITE UR QL STRIP: NEGATIVE
NON-SQ EPI CELLS URNS QL MICRO: ABNORMAL /HPF
PH UR STRIP.AUTO: 6 [PH] (ref 4.5–8)
PROT UR STRIP-MCNC: NEGATIVE MG/DL
RBC #/AREA URNS AUTO: ABNORMAL /HPF
SP GR UR STRIP.AUTO: 1.02 (ref 1–1.03)
UROBILINOGEN UR QL STRIP.AUTO: 0.2 E.U./DL
WBC #/AREA URNS AUTO: ABNORMAL /HPF

## 2018-10-07 PROCEDURE — 81025 URINE PREGNANCY TEST: CPT | Performed by: NURSE PRACTITIONER

## 2018-10-07 PROCEDURE — 99283 EMERGENCY DEPT VISIT LOW MDM: CPT

## 2018-10-07 PROCEDURE — 81001 URINALYSIS AUTO W/SCOPE: CPT | Performed by: NURSE PRACTITIONER

## 2018-10-07 RX ORDER — ONDANSETRON 4 MG/1
4 TABLET, FILM COATED ORAL EVERY 8 HOURS PRN
Qty: 12 TABLET | Refills: 0 | Status: SHIPPED | OUTPATIENT
Start: 2018-10-07 | End: 2018-10-19

## 2018-10-07 RX ORDER — ONDANSETRON 4 MG/1
4 TABLET, ORALLY DISINTEGRATING ORAL ONCE
Status: COMPLETED | OUTPATIENT
Start: 2018-10-07 | End: 2018-10-07

## 2018-10-07 RX ADMIN — ONDANSETRON 4 MG: 4 TABLET, ORALLY DISINTEGRATING ORAL at 11:08

## 2018-10-07 NOTE — DISCHARGE INSTRUCTIONS

## 2018-10-07 NOTE — ED PROVIDER NOTES
History  Chief Complaint   Patient presents with    Nausea     pt co of nausea nd vomiting onset today at work, no symptoms at this moment      75-year-old female sent here from work after vomiting  She reports that she was feeling a little queasy when she took a bite of a ham and cheese sandwich at work and then subsequently got sick and vomited  She denies any fever chills  Last few days or 1-2 days she has been feeling sort of queasy with loss of appetite  She reports that she feels drained because of her work schedule requiring very early hours at [employer]  She has no focal abdominal tenderness denies diarrhea  She has no alleviating factors she has not really tried anything  Clearly her symptoms are aggravated by food from [employer]  She is unsure of her menses schedule  She states last cycle she skip 3 pills and since then her menses has been off  She is sexually active so we will obtain urine pregnancy  Prior to Admission Medications   Prescriptions Last Dose Informant Patient Reported? Taking?   norgestimate-ethinyl estradiol (Nasrin Adame) 0 25-35 MG-MCG per tablet   No No   Sig: Take 1 tablet by mouth daily      Facility-Administered Medications: None       Past Medical History:   Diagnosis Date    Varicella     vaccine        Past Surgical History:   Procedure Laterality Date    NO PAST SURGERIES         Family History   Problem Relation Age of Onset    [de-identified] / [place] Sister     Cancer Maternal Uncle     Heart disease Maternal Grandmother     Arthritis Maternal Grandmother     Heart disease Maternal Grandfather     Diabetes Paternal Grandmother     Cancer Maternal Uncle      I have reviewed and agree with the history as documented  Social History   Substance Use Topics    Smoking status: Never Smoker    Smokeless tobacco: Never Used    Alcohol use No        Review of Systems   Constitutional: Negative for activity change, fatigue and fever  HENT: Negative for congestion, ear pain, rhinorrhea and sore throat  Eyes: Negative  Respiratory: Negative for cough, shortness of breath and wheezing  Gastrointestinal: Positive for nausea and vomiting  Negative for abdominal pain and diarrhea  Endocrine: Negative  Genitourinary: Negative for difficulty urinating, dyspareunia, dysuria, flank pain, frequency, menstrual problem, pelvic pain, urgency, vaginal bleeding, vaginal discharge and vaginal pain  Musculoskeletal: Negative for arthralgias and myalgias  Skin: Negative for color change and pallor  Neurological: Negative for dizziness, speech difficulty, weakness and headaches  Hematological: Negative for adenopathy  Psychiatric/Behavioral: Negative for confusion  Physical Exam  Physical Exam   Constitutional: She is oriented to person, place, and time  She appears well-developed and well-nourished  She is cooperative  Non-toxic appearance  She does not have a sickly appearance  She does not appear ill  No distress  HENT:   Head: Normocephalic and atraumatic  Right Ear: Tympanic membrane and external ear normal    Left Ear: Tympanic membrane and external ear normal    Nose: No rhinorrhea, sinus tenderness or nasal deformity  No epistaxis  Right sinus exhibits no maxillary sinus tenderness and no frontal sinus tenderness  Left sinus exhibits no maxillary sinus tenderness and no frontal sinus tenderness  Mouth/Throat: Oropharynx is clear and moist and mucous membranes are normal  Normal dentition  Eyes: Pupils are equal, round, and reactive to light  EOM are normal    Neck: Normal range of motion  Neck supple  Cardiovascular: Normal rate, regular rhythm and normal heart sounds  No murmur heard  Pulmonary/Chest: Effort normal and breath sounds normal  No accessory muscle usage  No respiratory distress  She has no wheezes  She has no rales  She exhibits no tenderness  Abdominal: Soft  She exhibits no distension   There is no guarding  Musculoskeletal: Normal range of motion  She exhibits no edema or tenderness  Lymphadenopathy:     She has no cervical adenopathy  Neurological: She is alert and oriented to person, place, and time  She exhibits normal muscle tone  Skin: Skin is warm and dry  No rash noted  No erythema  Psychiatric: She has a normal mood and affect  Nursing note and vitals reviewed        Vital Signs  ED Triage Vitals   Temperature Pulse Respirations Blood Pressure SpO2   10/07/18 1023 10/07/18 1021 10/07/18 1021 10/07/18 1021 10/07/18 1021   98 9 °F (37 2 °C) 74 16 114/67 99 %      Temp Source Heart Rate Source Patient Position - Orthostatic VS BP Location FiO2 (%)   10/07/18 1023 10/07/18 1021 10/07/18 1021 10/07/18 1021 --   Oral Monitor Sitting Right arm       Pain Score       10/07/18 1021       No Pain           Vitals:    10/07/18 1021   BP: 114/67   Pulse: 74   Patient Position - Orthostatic VS: Sitting       Visual Acuity      ED Medications  Medications   ondansetron (ZOFRAN-ODT) dispersible tablet 4 mg (4 mg Oral Given 10/7/18 1108)       Diagnostic Studies  Results Reviewed     Procedure Component Value Units Date/Time    Urine Microscopic [[id number]]  (Abnormal) Collected:  10/07/18 1105    Lab Status:  Final result Specimen:  Urine from Urine, Clean Catch Updated:  10/07/18 1118     RBC, UA 2-4 (A) /hpf      WBC, UA 4-10 (A) /hpf      Epithelial Cells Occasional /hpf      Bacteria, UA Occasional /hpf     UA w Reflex to Microscopic [[id number]]  (Abnormal) Collected:  10/07/18 1105    Lab Status:  Final result Specimen:  Urine from Urine, Clean Catch Updated:  10/07/18 1111     Color, UA Yellow     Clarity, UA Clear     Specific Gravity, UA 1 025     pH, UA 6 0     Leukocytes, UA Small (A)     Nitrite, UA Negative     Protein, UA Negative mg/dl      Glucose, UA Negative mg/dl      Ketones, UA Negative mg/dl      Urobilinogen, UA 0 2 E U /dl      Bilirubin, UA Negative     Blood, UA Negative POCT pregnancy, urine [[id number]]  (Normal) Resulted:  10/07/18 1107    Lab Status:  Final result Updated:  10/07/18 1107     EXT PREG TEST UR (Ref: Negative) negative                 No orders to display              Procedures  Procedures       Phone Contacts  ED Phone Contact    ED Course                               MDM  Number of Diagnoses or Management Options  Non-intractable vomiting with nausea, unspecified vomiting type: new and requires workup  Diagnosis management comments: Terms of urine microscopy, borderline  Has no complaints of urinary tract infection symptoms  I guess we will follow culture if culture is positive will contact to seal the patient is doing  But I think this is likely contaminant       Amount and/or Complexity of Data Reviewed  Clinical lab tests: reviewed and ordered  Independent visualization of images, tracings, or specimens: yes    Patient Progress  Patient progress: stable    CritCare Time    Disposition  Final diagnoses:   Non-intractable vomiting with nausea, unspecified vomiting type     Time reflects when diagnosis was documented in both MDM as applicable and the Disposition within this note     Time User Action Codes Description Comment    10/7/2018 10:51 AM Brian Myles Add [R11 2] Non-intractable vomiting with nausea, unspecified vomiting type       ED Disposition     ED Disposition Condition Comment    Discharge  Edwina Carrillo discharge to home/self care      Condition at discharge: Good        Follow-up Information     Follow up With Specialties Details Why Cristian Hyman MD Obstetrics and Gynecology, Obstetrics, Gynecology Schedule an appointment as soon as possible for a visit For Continued Evaluation [facility] [address]      Roxie Moralez MD Obstetrics and Gynecology, Obstetrics, Gynecology  For Continued Evaluation [facility] [address]   [phone]            Discharge Medication List as of 10/7/2018 10:52 AM      START taking these medications    Details   ondansetron (ZOFRAN) 4 mg tablet Take 1 tablet (4 mg total) by mouth every 8 (eight) hours as needed for nausea or vomiting for up to 12 days, Starting Sun 10/7/2018, Until Fri 10/19/2018, Print         CONTINUE these medications which have NOT CHANGED    Details   norgestimate-ethinyl estradiol ([address]) 0 25-35 MG-MCG per tablet Take 1 tablet by mouth daily, Starting u 6/7/2018, Normal           No discharge procedures on file      ED Provider  Electronically Signed by           TORY Esquivel  10/07/18 6258

## 2019-04-25 ENCOUNTER — TELEPHONE (OUTPATIENT)
Dept: PEDIATRICS CLINIC | Facility: CLINIC | Age: 19
End: 2019-04-25

## 2019-04-25 DIAGNOSIS — Z13.9 SCREENING FOR CONDITION: Primary | ICD-10-CM

## 2019-04-26 ENCOUNTER — APPOINTMENT (OUTPATIENT)
Dept: LAB | Facility: MEDICAL CENTER | Age: 19
End: 2019-04-26
Payer: COMMERCIAL

## 2019-04-26 DIAGNOSIS — Z13.9 SCREENING FOR CONDITION: ICD-10-CM

## 2019-04-26 PROCEDURE — 36415 COLL VENOUS BLD VENIPUNCTURE: CPT

## 2019-04-26 PROCEDURE — 86480 TB TEST CELL IMMUN MEASURE: CPT

## 2019-04-29 ENCOUNTER — TELEPHONE (OUTPATIENT)
Dept: PEDIATRICS CLINIC | Facility: CLINIC | Age: 19
End: 2019-04-29

## 2019-04-29 LAB
GAMMA INTERFERON BACKGROUND BLD IA-ACNC: 0.02 IU/ML
M TB IFN-G BLD-IMP: NEGATIVE
M TB IFN-G CD4+ BCKGRND COR BLD-ACNC: 0 IU/ML
M TB IFN-G CD4+ BCKGRND COR BLD-ACNC: 0 IU/ML
MITOGEN IGNF BCKGRD COR BLD-ACNC: >10 IU/ML

## 2025-05-07 ENCOUNTER — APPOINTMENT (EMERGENCY)
Dept: CT IMAGING | Facility: HOSPITAL | Age: 25
End: 2025-05-07

## 2025-05-07 ENCOUNTER — HOSPITAL ENCOUNTER (EMERGENCY)
Facility: HOSPITAL | Age: 25
Discharge: LEFT BEFORE DISCHARGE | End: 2025-05-07
Attending: EMERGENCY MEDICINE

## 2025-05-07 VITALS
OXYGEN SATURATION: 98 % | RESPIRATION RATE: 17 BRPM | TEMPERATURE: 97.8 F | SYSTOLIC BLOOD PRESSURE: 111 MMHG | HEART RATE: 74 BPM | DIASTOLIC BLOOD PRESSURE: 92 MMHG

## 2025-05-07 DIAGNOSIS — F19.929 INTOXICATION BY DRUG (HCC): Primary | ICD-10-CM

## 2025-05-07 LAB
ALBUMIN SERPL BCG-MCNC: 4.1 G/DL (ref 3.5–5)
ALP SERPL-CCNC: 59 U/L (ref 34–104)
ALT SERPL W P-5'-P-CCNC: 21 U/L (ref 7–52)
ANION GAP SERPL CALCULATED.3IONS-SCNC: 2 MMOL/L (ref 4–13)
APAP SERPL-MCNC: <2 UG/ML (ref 10–20)
AST SERPL W P-5'-P-CCNC: 25 U/L (ref 13–39)
ATRIAL RATE: 105 BPM
BASOPHILS # BLD AUTO: 0.04 THOUSANDS/ÂΜL (ref 0–0.1)
BASOPHILS NFR BLD AUTO: 1 % (ref 0–1)
BILIRUB SERPL-MCNC: 0.21 MG/DL (ref 0.2–1)
BUN SERPL-MCNC: 13 MG/DL (ref 5–25)
CALCIUM SERPL-MCNC: 9 MG/DL (ref 8.4–10.2)
CHLORIDE SERPL-SCNC: 103 MMOL/L (ref 96–108)
CO2 SERPL-SCNC: 35 MMOL/L (ref 21–32)
CREAT SERPL-MCNC: 0.77 MG/DL (ref 0.6–1.3)
EOSINOPHIL # BLD AUTO: 0.64 THOUSAND/ÂΜL (ref 0–0.61)
EOSINOPHIL NFR BLD AUTO: 7 % (ref 0–6)
ERYTHROCYTE [DISTWIDTH] IN BLOOD BY AUTOMATED COUNT: 12.6 % (ref 11.6–15.1)
ETHANOL SERPL-MCNC: <10 MG/DL
GFR SERPL CREATININE-BSD FRML MDRD: 106 ML/MIN/1.73SQ M
GLUCOSE SERPL-MCNC: 78 MG/DL (ref 65–140)
HCG SERPL QL: NEGATIVE
HCT VFR BLD AUTO: 38.4 % (ref 34.8–46.1)
HGB BLD-MCNC: 13.1 G/DL (ref 11.5–15.4)
IMM GRANULOCYTES # BLD AUTO: 0.02 THOUSAND/UL (ref 0–0.2)
IMM GRANULOCYTES NFR BLD AUTO: 0 % (ref 0–2)
LYMPHOCYTES # BLD AUTO: 1.77 THOUSANDS/ÂΜL (ref 0.6–4.47)
LYMPHOCYTES NFR BLD AUTO: 20 % (ref 14–44)
MCH RBC QN AUTO: 30.3 PG (ref 26.8–34.3)
MCHC RBC AUTO-ENTMCNC: 34.1 G/DL (ref 31.4–37.4)
MCV RBC AUTO: 89 FL (ref 82–98)
MONOCYTES # BLD AUTO: 0.48 THOUSAND/ÂΜL (ref 0.17–1.22)
MONOCYTES NFR BLD AUTO: 5 % (ref 4–12)
NEUTROPHILS # BLD AUTO: 5.86 THOUSANDS/ÂΜL (ref 1.85–7.62)
NEUTS SEG NFR BLD AUTO: 67 % (ref 43–75)
NRBC BLD AUTO-RTO: 0 /100 WBCS
P AXIS: 76 DEGREES
PLATELET # BLD AUTO: 237 THOUSANDS/UL (ref 149–390)
PMV BLD AUTO: 9.9 FL (ref 8.9–12.7)
POTASSIUM SERPL-SCNC: 4 MMOL/L (ref 3.5–5.3)
PR INTERVAL: 154 MS
PROT SERPL-MCNC: 6.1 G/DL (ref 6.4–8.4)
QRS AXIS: 69 DEGREES
QRSD INTERVAL: 76 MS
QT INTERVAL: 340 MS
QTC INTERVAL: 449 MS
RBC # BLD AUTO: 4.33 MILLION/UL (ref 3.81–5.12)
SALICYLATES SERPL-MCNC: <5 MG/DL (ref 5–20)
SODIUM SERPL-SCNC: 140 MMOL/L (ref 135–147)
T WAVE AXIS: 65 DEGREES
VENTRICULAR RATE: 105 BPM
WBC # BLD AUTO: 8.81 THOUSAND/UL (ref 4.31–10.16)

## 2025-05-07 PROCEDURE — 82077 ASSAY SPEC XCP UR&BREATH IA: CPT | Performed by: EMERGENCY MEDICINE

## 2025-05-07 PROCEDURE — 93005 ELECTROCARDIOGRAM TRACING: CPT

## 2025-05-07 PROCEDURE — 80053 COMPREHEN METABOLIC PANEL: CPT | Performed by: EMERGENCY MEDICINE

## 2025-05-07 PROCEDURE — 80143 DRUG ASSAY ACETAMINOPHEN: CPT | Performed by: EMERGENCY MEDICINE

## 2025-05-07 PROCEDURE — 99283 EMERGENCY DEPT VISIT LOW MDM: CPT

## 2025-05-07 PROCEDURE — 84703 CHORIONIC GONADOTROPIN ASSAY: CPT | Performed by: EMERGENCY MEDICINE

## 2025-05-07 PROCEDURE — 85025 COMPLETE CBC W/AUTO DIFF WBC: CPT | Performed by: EMERGENCY MEDICINE

## 2025-05-07 PROCEDURE — 80179 DRUG ASSAY SALICYLATE: CPT | Performed by: EMERGENCY MEDICINE

## 2025-05-07 PROCEDURE — 36415 COLL VENOUS BLD VENIPUNCTURE: CPT | Performed by: EMERGENCY MEDICINE

## 2025-05-07 PROCEDURE — 99285 EMERGENCY DEPT VISIT HI MDM: CPT | Performed by: EMERGENCY MEDICINE

## 2025-05-07 PROCEDURE — 93010 ELECTROCARDIOGRAM REPORT: CPT | Performed by: INTERNAL MEDICINE

## 2025-05-07 NOTE — ED PROVIDER NOTES
ED Disposition       None          Assessment & Plan   {Hyperlinks  Risk Stratification - NIHSS - HEART SCORE - Fill out sepsis note and make sure you call [id number] if severe or septic shock:[id number]}    Medical Decision Making  Amount and/or Complexity of Data Reviewed  Labs: ordered. Decision-making details documented in ED Course.  Radiology: ordered.        ED Course as of 05/07/25 1138   Wed May 07, 2025   1108 Pulse(!): 113   1108 Procedure Note: EKG  Date/Time: 05/07/25 11:08 AM   Interpreted by: Janae Sandoval D.O.  Indications / Diagnosis: drug use  ECG reviewed by me, the ED Provider: yes   The EKG demonstrates:  Rhythm: sinus tachycardia  Intervals: normal intervals  Axis: normal axis  QRS/Blocks: normal QRS  ST Changes: No acute ST Changes, no STD/KAYLIN.   1133 CBC and differential(!)  Unremarkable.        Medications - No data to display    ED Risk Strat Scores                    No data recorded                            History of Present Illness   {Hyperlinks  History (Med, Surg, Fam, Social) - Current Medications - Allergies  :[id number]}    Chief Complaint   Patient presents with    Recreational Drug Use     Pt arrives via EMS from a field where she was smoking marijuana laced with horse tranquilizer. EMS originally called for a possible cardiac arrest because pt was unresponsive. Friends threw water on her in an attempt to revive her. Pt alert and oriented upon arrival.       Past Medical History:   Diagnosis Date    Varicella     vaccine       Past Surgical History:   Procedure Laterality Date    NO PAST SURGERIES        Family History   Problem Relation Age of Onset    Miscarriages / Stillbirths Sister     Cancer Maternal Uncle     Heart disease Maternal Grandmother     Arthritis Maternal Grandmother     Heart disease Maternal Grandfather     Diabetes Paternal Grandmother     Cancer Maternal Uncle       Social History     Tobacco Use    Smoking status: Never    Smokeless tobacco: Never   Substance  Use Topics    Alcohol use: No    Drug use: No      E-Cigarette/Vaping      E-Cigarette/Vaping Substances      I have reviewed and agree with the history as documented.     HPI    Review of Systems   Respiratory:  Negative for shortness of breath.    Cardiovascular:  Negative for chest pain.   Gastrointestinal:  Negative for abdominal pain, nausea and vomiting.   Musculoskeletal:  Positive for neck pain.   Skin:  Positive for wound.   Neurological:  Negative for headaches.   All other systems reviewed and are negative.          Objective   {Hyperlinks  Historical Vitals - Historical Labs - Chart Review/Microbiology - Last Echo - Code Status  :[id number]}    ED Triage Vitals   Temperature Pulse Blood Pressure Respirations SpO2 Patient Position - Orthostatic VS   05/07/25 1051 05/07/25 1028 05/07/25 1028 05/07/25 1028 05/07/25 1028 05/07/25 1028   97.8 °F (36.6 °C) (!) 113 111/92 19 98 % Sitting      Temp Source Heart Rate Source BP Location FiO2 (%) Pain Score    05/07/25 1051 05/07/25 1028 05/07/25 1028 -- --    Oral Monitor Right arm        Vitals      Date and Time Temp Pulse SpO2 Resp BP Pain Score FACES Pain Rating User   05/07/25 1106 -- 82 91 % 18 111/92 -- -- CB   05/07/25 1051 97.8 °F (36.6 °C) -- -- -- -- -- -- CB   05/07/25 1028 -- 113 98 % 19 111/92 -- -- LF            Physical Exam  Vitals and nursing note reviewed.   Constitutional:       General: She is awake. She is not in acute distress.     Appearance: She is not toxic-appearing.   HENT:      Head: Normocephalic and atraumatic.      Comments: Scatted scrapes/abrasions over face. No facial bone tenderness. No skull deformities or contusions noted.   Eyes:      General: Vision grossly intact. Gaze aligned appropriately.   Cardiovascular:      Rate and Rhythm: Normal rate and regular rhythm.      Heart sounds: Normal heart sounds.   Pulmonary:      Effort: Pulmonary effort is normal. No respiratory distress.      Breath sounds: Normal breath sounds.    Abdominal:      Palpations: Abdomen is soft.      Tenderness: There is no abdominal tenderness.   Musculoskeletal:      Cervical back: Full passive range of motion without pain and neck supple. Spinous process tenderness present.   Skin:     General: Skin is warm and dry.   Neurological:      General: No focal deficit present.      Mental Status: She is alert and oriented to person, place, and time.         Results Reviewed       Procedure Component Value Units Date/Time    Ethanol [[id number]]  (Normal) Collected: 05/07/25 1109    Lab Status: Final result Specimen: Blood from Arm, Left Updated: 05/07/25 1134     Ethanol Lvl <10 mg/dL     CBC and differential [[id number]]  (Abnormal) Collected: 05/07/25 1109    Lab Status: Final result Specimen: Blood from Arm, Left Updated: 05/07/25 1115     WBC 8.81 Thousand/uL      RBC 4.33 Million/uL      Hemoglobin 13.1 g/dL      Hematocrit 38.4 %      MCV 89 fL      MCH 30.3 pg      MCHC 34.1 g/dL      RDW 12.6 %      MPV 9.9 fL      Platelets 237 Thousands/uL      nRBC 0 /100 WBCs      Segmented % 67 %      Immature Grans % 0 %      Lymphocytes % 20 %      Monocytes % 5 %      Eosinophils Relative 7 %      Basophils Relative 1 %      Absolute Neutrophils 5.86 Thousands/µL      Absolute Immature Grans 0.02 Thousand/uL      Absolute Lymphocytes 1.77 Thousands/µL      Absolute Monocytes 0.48 Thousand/µL      Eosinophils Absolute 0.64 Thousand/µL      Basophils Absolute 0.04 Thousands/µL     Comprehensive metabolic panel [[id number]] Collected: 05/07/25 1109    Lab Status: In process Specimen: Blood from Arm, Left Updated: 05/07/25 1113    Salicylate level [[id number]] Collected: 05/07/25 1109    Lab Status: In process Specimen: Blood from Arm, Left Updated: 05/07/25 1113    Acetaminophen level-If concentration is detectable, please discuss with medical  on call. [[id number]] Collected: 05/07/25 1109    Lab Status: In process Specimen: Blood from Arm, Left Updated:  05/07/25 1113    Rapid drug screen, urine [[id number]]     Lab Status: No result Specimen: Urine     POCT pregnancy, urine [[id number]]     Lab Status: No result             CT head without contrast    (Results Pending)   CT spine cervical without contrast    (Results Pending)   CT chest abdomen pelvis w contrast    (Results Pending)       Procedures    ED Medication and Procedure Management   Prior to Admission Medications   Prescriptions Last Dose Informant Patient Reported? Taking?   norgestimate-ethinyl estradiol (MONO-LINYAH) 0.25-35 MG-MCG per tablet   No No   Sig: Take 1 tablet by mouth daily   ondansetron (ZOFRAN) 4 mg tablet   No No   Sig: Take 1 tablet (4 mg total) by mouth every 8 (eight) hours as needed for nausea or vomiting for up to 12 days      Facility-Administered Medications: None     Patient's Medications   Discharge Prescriptions    No medications on file     No discharge procedures on file.  ED SEPSIS DOCUMENTATION

## 2025-07-05 NOTE — ED PROVIDER NOTES
Time reflects when diagnosis was documented in both MDM as applicable and the Disposition within this note       Time User Action Codes Description Comment    5/7/2025  1:39 PM Janae Sandoval Add [F19.929] Intoxication by drug (HCC)           ED Disposition       ED Disposition   AMA    Condition   --    Date/Time   Wed May 7, 2025  1:40 PM    Comment   Date: 5/7/2025  Patient: Anel Ty  Admitted: 5/7/2025 10:25 AM  Attending Provider: Janae Sandoval DO    Anel Ty or her authorized caregiver has made the decision for the patient to leave the emergency department against the advice of the em ergency department staff. She or her authorized caregiver has been informed and understands the inherent risks, including death or permanent disability.  She or her authorized caregiver has decided to accept the responsibility for this decision. Anel Ty and all necessary parties have been advised that she may return for further evaluation or treatment. Her condition at time of discharge was stable.  Anel Ty had current vital signs as follows:  /92   Pulse 82   Temp 97.8 °F (36.6  °C) (Oral)   Resp 18                Assessment & Plan       Medical Decision Making  24 y.o. female presents for evaluation after she was found unresponsive after reportedly smoking marijuana laced with some other drug. On exam, patient tachycardic, otherwise with normal vitals, in no acute distress. Patient noted to have multiple scattered scrapes/abrasions. No focal neurologic deficits. Differential diagnosis includes, but is not limited to, acute drug overdose, acute electrolyte abnormalities, acute traumatic injuries, or other acute pathology. Will evaluate with CT scans of the head, cervical spine, chest, abdomen, and pelvis, and full metabolic workup.     Patient's lab workup notable for a mildly elevated carbon dioxide level of 35, otherwise unremarkable.  Patient refused CT scans, see ED course below for further details.   Patient signed out of the emergency department AGAINST MEDICAL ADVICE.     Amount and/or Complexity of Data Reviewed  Labs: ordered. Decision-making details documented in ED Course.        ED Course as of 07/05/25 2257   Wed May 07, 2025   1108 Pulse(!): 113   1108 Procedure Note: EKG  Date/Time: 05/07/25 11:08 AM   Interpreted by: Janae Sandoval D.O.  Indications / Diagnosis: drug use  ECG reviewed by me, the ED Provider: yes   The EKG demonstrates:  Rhythm: sinus tachycardia  Intervals: normal intervals  Axis: normal axis  QRS/Blocks: normal QRS  ST Changes: No acute ST Changes, no STD/KAYLIN.   1133 CBC and differential(!)  Unremarkable.    1152 Carbon Dioxide(!): 35   1324 PREGNANCY, SERUM: Negative   1336 Patient refusing CT scans at this time.  Her significant other is at bedside, states that she did not fall and that the scrapes on her face are from her picking at it.  I did discuss the risks of not obtaining CT scans, including, but not limited to, permanent disability and death.  Patient understood these risks.  She was instructed to follow-up with her PCP if symptoms persisted, or return to the emergency department for any new or worsening symptoms.  At this time, patient leaving AGAINST MEDICAL ADVICE.       Medications - No data to display    ED Risk Strat Scores                    No data recorded        SBIRT 20yo+      Flowsheet Row Most Recent Value   Initial Alcohol Screen: US AUDIT-C     1. How often do you have a drink containing alcohol? 0 Filed at: 05/07/2025 1115   2. How many drinks containing alcohol do you have on a typical day you are drinking?  0 Filed at: 05/07/2025 1115   3b. FEMALE Any Age, or MALE 65+: How often do you have 4 or more drinks on one occassion? 0 Filed at: 05/07/2025 1115   Audit-C Score 0 Filed at: 05/07/2025 1115   KAIDEN: How many times in the past year have you...    Used an illegal drug or used a prescription medication for non-medical reasons? Never Filed at: 05/07/2025 1115  "                           History of Present Illness       Chief Complaint   Patient presents with    Recreational Drug Use     Pt arrives via EMS from a field where she was smoking marijuana laced with horse tranquilizer. EMS originally called for a possible cardiac arrest because pt was unresponsive. Friends threw water on her in an attempt to revive her. Pt alert and oriented upon arrival.       Past Medical History[1]   Past Surgical History[2]   Family History[3]   Social History[4]   E-Cigarette/Vaping      E-Cigarette/Vaping Substances      I have reviewed and agree with the history as documented.     Patient is a 24 y.o. female who presents via EMS for evaluation after she smoked marijuana which she believes was \"laced with tranq\". Patient was noted to be unresponsive, and her friends threw water on her in an attempt to wake her up. On EMS arrival, patient was awake. Patient currently denies any complaints including chest pain, shortness of breath, abdominal pain, nausea, vomiting, or other concerning symptoms.         Review of Systems   Constitutional:  Negative for fever.   Respiratory:  Negative for shortness of breath.    Cardiovascular:  Negative for chest pain.   Gastrointestinal:  Negative for abdominal pain.   All other systems reviewed and are negative.          Objective       ED Triage Vitals   Temperature Pulse Blood Pressure Respirations SpO2 Patient Position - Orthostatic VS   05/07/25 1051 05/07/25 1028 05/07/25 1028 05/07/25 1028 05/07/25 1028 05/07/25 1028   97.8 °F (36.6 °C) (!) 113 111/92 19 98 % Sitting      Temp Source Heart Rate Source BP Location FiO2 (%) Pain Score    05/07/25 1051 05/07/25 1028 05/07/25 1028 -- --    Oral Monitor Right arm        Vitals      Date and Time Temp Pulse SpO2 Resp BP Pain Score FACES Pain Rating User   05/07/25 1330 -- 74 98 % 17 -- -- -- VMW   05/07/25 1106 -- 82 91 % 18 111/92 -- -- CB   05/07/25 1051 97.8 °F (36.6 °C) -- -- -- -- -- -- CB   05/07/25 " 1028 -- 113 98 % 19 111/92 -- -- LF            Physical Exam  Vitals and nursing note reviewed.   Constitutional:       General: She is awake. She is not in acute distress.     Appearance: She is not toxic-appearing.   HENT:      Head: Normocephalic and atraumatic.     Eyes:      General: Vision grossly intact. Gaze aligned appropriately.       Cardiovascular:      Rate and Rhythm: Regular rhythm. Tachycardia present.      Heart sounds: Normal heart sounds.   Pulmonary:      Effort: Pulmonary effort is normal. No respiratory distress.      Breath sounds: Normal breath sounds.     Musculoskeletal:      Cervical back: Full passive range of motion without pain and neck supple.     Skin:     General: Skin is warm and dry.      Comments: Multiple scrapes in various places noted.      Neurological:      General: No focal deficit present.      Mental Status: She is alert and oriented to person, place, and time.      GCS: GCS eye subscore is 4. GCS verbal subscore is 5. GCS motor subscore is 6.         Results Reviewed       Procedure Component Value Units Date/Time    hCG, qualitative pregnancy [[id number]]  (Normal) Collected: 05/07/25 1109    Lab Status: Final result Specimen: Blood from Arm, Left Updated: 05/07/25 1320     Preg, Serum Negative    Comprehensive metabolic panel [[id number]]  (Abnormal) Collected: 05/07/25 1109    Lab Status: Final result Specimen: Blood from Arm, Left Updated: 05/07/25 1149     Sodium 140 mmol/L      Potassium 4.0 mmol/L      Chloride 103 mmol/L      CO2 35 mmol/L      ANION GAP 2 mmol/L      BUN 13 mg/dL      Creatinine 0.77 mg/dL      Glucose 78 mg/dL      Calcium 9.0 mg/dL      AST 25 U/L      ALT 21 U/L      Alkaline Phosphatase 59 U/L      Total Protein 6.1 g/dL      Albumin 4.1 g/dL      Total Bilirubin 0.21 mg/dL      eGFR 106 ml/min/1.73sq m     Narrative:      [employer] guidelines for Chronic Kidney Disease (CKD):     Stage 1 with normal or high GFR (GFR >  90 mL/min/1.73 square meters)    Stage 2 Mild CKD (GFR = 60-89 mL/min/1.73 square meters)    Stage 3A Moderate CKD (GFR = 45-59 mL/min/1.73 square meters)    Stage 3B Moderate CKD (GFR = 30-44 mL/min/1.73 square meters)    Stage 4 Severe CKD (GFR = 15-29 mL/min/1.73 square meters)    Stage 5 End Stage CKD (GFR <15 mL/min/1.73 square meters)  Note: GFR calculation is accurate only with a steady state creatinine    Salicylate level [[id number]]  (Abnormal) Collected: 05/07/25 1109    Lab Status: Final result Specimen: Blood from Arm, Left Updated: 05/07/25 1149     Salicylate Lvl <5 mg/dL     Acetaminophen level-If concentration is detectable, please discuss with medical  on call. [[id number]]  (Abnormal) Collected: 05/07/25 1109    Lab Status: Final result Specimen: Blood from Arm, Left Updated: 05/07/25 1149     Acetaminophen Level <2 ug/mL     Ethanol [[id number]]  (Normal) Collected: 05/07/25 1109    Lab Status: Final result Specimen: Blood from Arm, Left Updated: 05/07/25 1134     Ethanol Lvl <10 mg/dL     CBC and differential [[id number]]  (Abnormal) Collected: 05/07/25 1109    Lab Status: Final result Specimen: Blood from Arm, Left Updated: 05/07/25 1115     WBC 8.81 Thousand/uL      RBC 4.33 Million/uL      Hemoglobin 13.1 g/dL      Hematocrit 38.4 %      MCV 89 fL      MCH 30.3 pg      MCHC 34.1 g/dL      RDW 12.6 %      MPV 9.9 fL      Platelets 237 Thousands/uL      nRBC 0 /100 WBCs      Segmented % 67 %      Immature Grans % 0 %      Lymphocytes % 20 %      Monocytes % 5 %      Eosinophils Relative 7 %      Basophils Relative 1 %      Absolute Neutrophils 5.86 Thousands/µL      Absolute Immature Grans 0.02 Thousand/uL      Absolute Lymphocytes 1.77 Thousands/µL      Absolute Monocytes 0.48 Thousand/µL      Eosinophils Absolute 0.64 Thousand/µL      Basophils Absolute 0.04 Thousands/µL             No orders to display       Procedures    ED Medication and Procedure Management   Prior to Admission  Medications   Prescriptions Last Dose Informant Patient Reported? Taking?   norgestimate-ethinyl estradiol (MONO-LINYAH) 0.25-35 MG-MCG per tablet   No No   Sig: Take 1 tablet by mouth daily   ondansetron (ZOFRAN) 4 mg tablet   No No   Sig: Take 1 tablet (4 mg total) by mouth every 8 (eight) hours as needed for nausea or vomiting for up to 12 days      Facility-Administered Medications: None     Discharge Medication List as of 5/7/2025  1:42 PM        CONTINUE these medications which have NOT CHANGED    Details   norgestimate-ethinyl estradiol (MONO-LINYAH) 0.25-35 MG-MCG per tablet Take 1 tablet by mouth daily, Starting Thu 6/7/2018, Normal      ondansetron (ZOFRAN) 4 mg tablet Take 1 tablet (4 mg total) by mouth every 8 (eight) hours as needed for nausea or vomiting for up to 12 days, Starting Sun 10/7/2018, Until Fri 10/19/2018, Print           No discharge procedures on file.  ED SEPSIS DOCUMENTATION   Time reflects when diagnosis was documented in both MDM as applicable and the Disposition within this note       Time User Action Codes Description Comment    5/7/2025  1:39 PM Janae Sandoval Add [F19.929] Intoxication by drug (HCC)                      [1]   Past Medical History:  Diagnosis Date    Varicella     vaccine    [2]   Past Surgical History:  Procedure Laterality Date    NO PAST SURGERIES     [3]   Family History  Problem Relation Name Age of Onset    Miscarriages / Stillbirths Sister      Cancer Maternal Uncle      Heart disease Maternal Grandmother      Arthritis Maternal Grandmother      Heart disease Maternal Grandfather      Diabetes Paternal Grandmother      Cancer Maternal Uncle     [4]   Social History  Tobacco Use    Smoking status: Never    Smokeless tobacco: Never   Substance Use Topics    Alcohol use: No    Drug use: No        Janae Sandoval,   07/05/25 2881